# Patient Record
Sex: FEMALE | URBAN - METROPOLITAN AREA
[De-identification: names, ages, dates, MRNs, and addresses within clinical notes are randomized per-mention and may not be internally consistent; named-entity substitution may affect disease eponyms.]

---

## 2019-05-21 ENCOUNTER — APPOINTMENT (RX ONLY)
Dept: URBAN - METROPOLITAN AREA CLINIC 117 | Facility: CLINIC | Age: 24
Setting detail: DERMATOLOGY
End: 2019-05-21

## 2019-05-21 DIAGNOSIS — D22 MELANOCYTIC NEVI: ICD-10-CM

## 2019-05-21 DIAGNOSIS — L01.01 NON-BULLOUS IMPETIGO: ICD-10-CM

## 2019-05-21 PROBLEM — L70.0 ACNE VULGARIS: Status: ACTIVE | Noted: 2019-05-21

## 2019-05-21 PROBLEM — D22.5 MELANOCYTIC NEVI OF TRUNK: Status: ACTIVE | Noted: 2019-05-21

## 2019-05-21 PROCEDURE — ? TREATMENT REGIMEN

## 2019-05-21 PROCEDURE — 99213 OFFICE O/P EST LOW 20 MIN: CPT

## 2019-05-21 PROCEDURE — ? COUNSELING

## 2019-05-21 ASSESSMENT — LOCATION ZONE DERM
LOCATION ZONE: FACE
LOCATION ZONE: TRUNK

## 2019-05-21 ASSESSMENT — LOCATION DETAILED DESCRIPTION DERM
LOCATION DETAILED: RIGHT INFERIOR CENTRAL MALAR CHEEK
LOCATION DETAILED: RIGHT SUPERIOR UPPER BACK
LOCATION DETAILED: LEFT INFERIOR CENTRAL MALAR CHEEK

## 2019-05-21 ASSESSMENT — LOCATION SIMPLE DESCRIPTION DERM
LOCATION SIMPLE: LEFT CHEEK
LOCATION SIMPLE: RIGHT UPPER BACK
LOCATION SIMPLE: RIGHT CHEEK

## 2019-05-21 NOTE — HPI: RASH (KERATOSIS PILARIS)
Is This A New Presentation, Or A Follow-Up?: Rash
Additional History: Had impetigo 1 month ago on lip Dr. Wynn nurse said that May have triggered K.P. Went away then came back a few days ago

## 2019-05-21 NOTE — PROCEDURE: COUNSELING
Detail Level: Detailed
Detail Level: Simple
Patient Specific Counseling (Will Not Stick From Patient To Patient): I discussed with mom and patient that this is looking/behaving a lot more like impetigo or other bacterial infection. KP tends to be more stable over time, and aside from her cheeks being erythematous, the clinical is not consistent with KP today.

## 2019-05-21 NOTE — HPI: SKIN LESION
Is This A New Presentation, Or A Follow-Up?: Mole
What Type Of Note Output Would You Prefer (Optional)?: Bullet Format
Has Your Skin Lesion Been Treated?: not been treated
Additional History: Mom states the mole looks weird, just wants evaluated
Which Family Member (Optional)?: Maternal grandfather and paternal grand mother

## 2019-05-21 NOTE — PROCEDURE: TREATMENT REGIMEN
Continue Regimen: Clindamycin lotion BID with moisturizer overtop until clear PRN flare
Plan: Patient leaves for [place] for three weeks this weekend.  Rec. take clinda lotion with her and use daily PRN.  If this eruption recurs, rec. f/u once she gets home and will consider PO abx if appropriate.  Reassured mom that this is not a scarring process.
Detail Level: Zone
Otc Regimen: Cerave cleanser QD-BID